# Patient Record
Sex: MALE | Race: WHITE | Employment: FULL TIME | URBAN - METROPOLITAN AREA
[De-identification: names, ages, dates, MRNs, and addresses within clinical notes are randomized per-mention and may not be internally consistent; named-entity substitution may affect disease eponyms.]

---

## 2017-10-23 ENCOUNTER — OFFICE VISIT (OUTPATIENT)
Dept: ORTHOPEDIC SURGERY | Age: 57
End: 2017-10-23

## 2017-10-23 VITALS
HEART RATE: 73 BPM | HEIGHT: 68 IN | WEIGHT: 205 LBS | SYSTOLIC BLOOD PRESSURE: 154 MMHG | DIASTOLIC BLOOD PRESSURE: 80 MMHG | BODY MASS INDEX: 31.07 KG/M2

## 2017-10-23 DIAGNOSIS — Z96.652 STATUS POST TOTAL LEFT KNEE REPLACEMENT: ICD-10-CM

## 2017-10-23 DIAGNOSIS — G89.29 CHRONIC PAIN OF LEFT KNEE: Primary | ICD-10-CM

## 2017-10-23 DIAGNOSIS — T84.89XD POSTOPERATIVE STIFFNESS OF TOTAL KNEE REPLACEMENT, SUBSEQUENT ENCOUNTER: ICD-10-CM

## 2017-10-23 DIAGNOSIS — M25.669 POSTOPERATIVE STIFFNESS OF TOTAL KNEE REPLACEMENT, SUBSEQUENT ENCOUNTER: ICD-10-CM

## 2017-10-23 DIAGNOSIS — Z96.659 POSTOPERATIVE STIFFNESS OF TOTAL KNEE REPLACEMENT, SUBSEQUENT ENCOUNTER: ICD-10-CM

## 2017-10-23 DIAGNOSIS — M25.562 CHRONIC PAIN OF LEFT KNEE: Primary | ICD-10-CM

## 2017-10-23 PROCEDURE — 73564 X-RAY EXAM KNEE 4 OR MORE: CPT | Performed by: ORTHOPAEDIC SURGERY

## 2017-10-23 PROCEDURE — 99214 OFFICE O/P EST MOD 30 MIN: CPT | Performed by: ORTHOPAEDIC SURGERY

## 2017-10-23 ASSESSMENT — ENCOUNTER SYMPTOMS: BACK PAIN: 1

## 2017-10-23 NOTE — PROGRESS NOTES
Review of Systems   Constitutional:        Weight gain     Cardiovascular:        HBP     Musculoskeletal: Positive for back pain. All other systems reviewed and are negative.

## 2017-10-23 NOTE — PROGRESS NOTES
History of Present Illness:  Natasha Betancur is a 61 y.o. male    Chief Complaint      Follow-up (left TKR 10/2016)   . He comes in today for follow-up evaluation on his knee. He did have a left total knee replacement approximately year ago. Following surgery, he had some stiffness and was given different anti-inflammatories including steroids and his motion ultimately improved about 120    Brief summary of past history and reason for visit[de-identified]  She's here because he's noticing increasing stiffness. Although he has good flexibility. He notices increased stiffness and it seems to be causing some trouble with activity. He has good mobility, but is concerned because he's noted a loss of some flexibility. He is on his feet all day long    He does notice pain level is only a 0 out of 10       Medical History:  Patient's medications, allergies, past medical, surgical, social and family histories were reviewed and updated as appropriate. Social History     Social History    Marital status:      Spouse name: N/A    Number of children: N/A    Years of education: N/A     Occupational History    Not on file. Social History Main Topics    Smoking status: Former Smoker     Quit date: 9/27/1992    Smokeless tobacco: Not on file    Alcohol use No    Drug use: No    Sexual activity: Not on file     Other Topics Concern    Not on file     Social History Narrative    No narrative on file       No Known Allergies  Current Outpatient Prescriptions on File Prior to Visit   Medication Sig Dispense Refill    Multiple Vitamins-Minerals (THERAPEUTIC MULTIVITAMIN-MINERALS) tablet Take 1 tablet by mouth daily      lisinopril (PRINIVIL;ZESTRIL) 10 MG tablet 20 mg daily   5     No current facility-administered medications on file prior to visit. family history is not on file.   Past Medical History:   Diagnosis Date    Arthritis     Hypertension      Past Medical History:   Diagnosis Date    Stability and meniscus pathology  . There is no gross instability. Patella tracking:  Tracking is normal      Neurologic and vascular: There is no sensory, vascular compromise      Skin: There are no lesions        Office Procedures:  Orders Placed This Encounter   Procedures    XR KNEE LEFT (MIN 4 VIEWS)     Order Specific Question:   Reason for exam:     Answer:   Pain and discomfort left knee         Radiology:      Views   X-rays:    4 view x-rays left knee. The left knee. An anatomic alignment. No component loosening or malalignment  Impression        Left total knee replacement with mild fibrosis inflammation and scarring. Lengthy and detailed discussion about treatment options. I did discuss with him anti-inflammatories and rehabilitation protocols. If not improved, may require arthrotomy and debridement      Treatment Plan:  Discussed with him the use of Curcumin with its anti-inflammatory effect    Discuss the use of meloxicam on a when necessary basis with rehabilitation    Final impresson and disposition: Left knee arthrofibrosis. Initiation of anti-inflammatory protocol with exercise                     Stevenson Al. Joss Carlin M.D. This dictation was performed with a verbal recognition program and it was checked for errors. It is possible that there are still dictated errors within this office note. Any errors should be brought immediately to my attention for correction.   All efforts were made to ensure that this office note is accurate